# Patient Record
Sex: FEMALE | Race: WHITE | Employment: OTHER | URBAN - METROPOLITAN AREA
[De-identification: names, ages, dates, MRNs, and addresses within clinical notes are randomized per-mention and may not be internally consistent; named-entity substitution may affect disease eponyms.]

---

## 2019-03-09 ENCOUNTER — HOSPITAL ENCOUNTER (INPATIENT)
Facility: HOSPITAL | Age: 54
LOS: 3 days | Discharge: HOME | DRG: 311 | End: 2019-03-12
Attending: EMERGENCY MEDICINE | Admitting: HOSPITALIST
Payer: COMMERCIAL

## 2019-03-09 ENCOUNTER — APPOINTMENT (OUTPATIENT)
Dept: CT IMAGING | Facility: HOSPITAL | Age: 54
DRG: 311 | End: 2019-03-09
Attending: EMERGENCY MEDICINE
Payer: COMMERCIAL

## 2019-03-09 ENCOUNTER — APPOINTMENT (OUTPATIENT)
Dept: GENERAL RADIOLOGY | Facility: HOSPITAL | Age: 54
DRG: 311 | End: 2019-03-09
Attending: EMERGENCY MEDICINE
Payer: COMMERCIAL

## 2019-03-09 DIAGNOSIS — R77.8 ELEVATED TROPONIN: Primary | ICD-10-CM

## 2019-03-09 DIAGNOSIS — R11.2 NAUSEA AND VOMITING, INTRACTABILITY OF VOMITING NOT SPECIFIED, UNSPECIFIED VOMITING TYPE: ICD-10-CM

## 2019-03-09 DIAGNOSIS — R10.13 EPIGASTRIC PAIN: ICD-10-CM

## 2019-03-09 PROBLEM — R73.9 HYPERGLYCEMIA: Status: ACTIVE | Noted: 2019-03-09

## 2019-03-09 LAB
ALBUMIN SERPL-MCNC: 4.1 G/DL (ref 3.4–5)
ALBUMIN/GLOB SERPL: 1.2 {RATIO} (ref 1–2)
ALP LIVER SERPL-CCNC: 103 U/L (ref 41–108)
ALT SERPL-CCNC: 18 U/L (ref 13–56)
ANION GAP SERPL CALC-SCNC: 9 MMOL/L (ref 0–18)
APTT PPP: 26.9 SECONDS (ref 26.1–34.6)
APTT PPP: 60.9 SECONDS (ref 26.1–34.6)
AST SERPL-CCNC: 27 U/L (ref 15–37)
BASOPHILS # BLD AUTO: 0.04 X10(3) UL (ref 0–0.2)
BASOPHILS NFR BLD AUTO: 0.3 %
BILIRUB SERPL-MCNC: 0.5 MG/DL (ref 0.1–2)
BUN BLD-MCNC: 14 MG/DL (ref 7–18)
BUN/CREAT SERPL: 11.7 (ref 10–20)
CALCIUM BLD-MCNC: 9.5 MG/DL (ref 8.5–10.1)
CHLORIDE SERPL-SCNC: 110 MMOL/L (ref 98–107)
CO2 SERPL-SCNC: 23 MMOL/L (ref 21–32)
CREAT BLD-MCNC: 1.2 MG/DL (ref 0.55–1.02)
DEPRECATED RDW RBC AUTO: 42.7 FL (ref 35.1–46.3)
EOSINOPHIL # BLD AUTO: 0.02 X10(3) UL (ref 0–0.7)
EOSINOPHIL NFR BLD AUTO: 0.1 %
ERYTHROCYTE [DISTWIDTH] IN BLOOD BY AUTOMATED COUNT: 13.9 % (ref 11–15)
GLOBULIN PLAS-MCNC: 3.4 G/DL (ref 2.8–4.4)
GLUCOSE BLD-MCNC: 114 MG/DL (ref 70–99)
HCT VFR BLD AUTO: 41.7 % (ref 35–48)
HGB BLD-MCNC: 14 G/DL (ref 12–16)
IMM GRANULOCYTES # BLD AUTO: 0.06 X10(3) UL (ref 0–1)
IMM GRANULOCYTES NFR BLD: 0.4 %
LIPASE SERPL-CCNC: 124 U/L (ref 73–393)
LYMPHOCYTES # BLD AUTO: 1.96 X10(3) UL (ref 1–4)
LYMPHOCYTES NFR BLD AUTO: 14.4 %
M PROTEIN MFR SERPL ELPH: 7.5 G/DL (ref 6.4–8.2)
MCH RBC QN AUTO: 28.2 PG (ref 26–34)
MCHC RBC AUTO-ENTMCNC: 33.6 G/DL (ref 31–37)
MCV RBC AUTO: 84.1 FL (ref 80–100)
MONOCYTES # BLD AUTO: 0.42 X10(3) UL (ref 0.1–1)
MONOCYTES NFR BLD AUTO: 3.1 %
NEUTROPHILS # BLD AUTO: 11.12 X10 (3) UL (ref 1.5–7.7)
NEUTROPHILS # BLD AUTO: 11.12 X10(3) UL (ref 1.5–7.7)
NEUTROPHILS NFR BLD AUTO: 81.7 %
OSMOLALITY SERPL CALC.SUM OF ELEC: 295 MOSM/KG (ref 275–295)
PLATELET # BLD AUTO: 228 10(3)UL (ref 150–450)
POTASSIUM SERPL-SCNC: 4 MMOL/L (ref 3.5–5.1)
RBC # BLD AUTO: 4.96 X10(6)UL (ref 3.8–5.3)
SODIUM SERPL-SCNC: 142 MMOL/L (ref 136–145)
TROPONIN I SERPL-MCNC: 0.91 NG/ML (ref ?–0.04)
TROPONIN I SERPL-MCNC: 1.83 NG/ML (ref ?–0.04)
WBC # BLD AUTO: 13.6 X10(3) UL (ref 4–11)

## 2019-03-09 PROCEDURE — 99223 1ST HOSP IP/OBS HIGH 75: CPT | Performed by: HOSPITALIST

## 2019-03-09 PROCEDURE — 71275 CT ANGIOGRAPHY CHEST: CPT | Performed by: EMERGENCY MEDICINE

## 2019-03-09 PROCEDURE — 71045 X-RAY EXAM CHEST 1 VIEW: CPT | Performed by: EMERGENCY MEDICINE

## 2019-03-09 PROCEDURE — 74175 CTA ABDOMEN W/CONTRAST: CPT | Performed by: EMERGENCY MEDICINE

## 2019-03-09 RX ORDER — ASPIRIN 81 MG/1
324 TABLET, CHEWABLE ORAL DAILY
Status: DISCONTINUED | OUTPATIENT
Start: 2019-03-09 | End: 2019-03-10

## 2019-03-09 RX ORDER — LORAZEPAM 2 MG/ML
1 INJECTION INTRAMUSCULAR ONCE
Status: COMPLETED | OUTPATIENT
Start: 2019-03-09 | End: 2019-03-09

## 2019-03-09 RX ORDER — MAGNESIUM HYDROXIDE/ALUMINUM HYDROXICE/SIMETHICONE 120; 1200; 1200 MG/30ML; MG/30ML; MG/30ML
30 SUSPENSION ORAL ONCE
Status: COMPLETED | OUTPATIENT
Start: 2019-03-09 | End: 2019-03-09

## 2019-03-09 RX ORDER — NICOTINE 21 MG/24HR
1 PATCH, TRANSDERMAL 24 HOURS TRANSDERMAL DAILY
Status: DISCONTINUED | OUTPATIENT
Start: 2019-03-09 | End: 2019-03-12

## 2019-03-09 RX ORDER — HEPARIN SODIUM AND DEXTROSE 10000; 5 [USP'U]/100ML; G/100ML
12 INJECTION INTRAVENOUS ONCE
Status: COMPLETED | OUTPATIENT
Start: 2019-03-09 | End: 2019-03-10

## 2019-03-09 RX ORDER — HYDRALAZINE HYDROCHLORIDE 20 MG/ML
10 INJECTION INTRAMUSCULAR; INTRAVENOUS ONCE
Status: COMPLETED | OUTPATIENT
Start: 2019-03-09 | End: 2019-03-09

## 2019-03-09 RX ORDER — TRAZODONE HYDROCHLORIDE 50 MG/1
50 TABLET ORAL NIGHTLY PRN
Status: DISCONTINUED | OUTPATIENT
Start: 2019-03-09 | End: 2019-03-12

## 2019-03-09 RX ORDER — NITROGLYCERIN 20 MG/100ML
10 INJECTION INTRAVENOUS CONTINUOUS
Status: DISCONTINUED | OUTPATIENT
Start: 2019-03-09 | End: 2019-03-11

## 2019-03-09 RX ORDER — HEPARIN SODIUM AND DEXTROSE 10000; 5 [USP'U]/100ML; G/100ML
INJECTION INTRAVENOUS CONTINUOUS
Status: DISCONTINUED | OUTPATIENT
Start: 2019-03-09 | End: 2019-03-10

## 2019-03-09 RX ORDER — HYDRALAZINE HYDROCHLORIDE 20 MG/ML
10 INJECTION INTRAMUSCULAR; INTRAVENOUS EVERY 4 HOURS PRN
Status: DISCONTINUED | OUTPATIENT
Start: 2019-03-09 | End: 2019-03-10

## 2019-03-09 RX ORDER — HEPARIN SODIUM 5000 [USP'U]/ML
60 INJECTION INTRAVENOUS; SUBCUTANEOUS ONCE
Status: COMPLETED | OUTPATIENT
Start: 2019-03-09 | End: 2019-03-09

## 2019-03-09 RX ORDER — MORPHINE SULFATE 4 MG/ML
4 INJECTION, SOLUTION INTRAMUSCULAR; INTRAVENOUS ONCE
Status: COMPLETED | OUTPATIENT
Start: 2019-03-09 | End: 2019-03-09

## 2019-03-09 RX ORDER — ONDANSETRON 2 MG/ML
4 INJECTION INTRAMUSCULAR; INTRAVENOUS EVERY 6 HOURS PRN
Status: DISCONTINUED | OUTPATIENT
Start: 2019-03-09 | End: 2019-03-11

## 2019-03-09 RX ORDER — SODIUM CHLORIDE 9 MG/ML
INJECTION, SOLUTION INTRAVENOUS CONTINUOUS
Status: DISCONTINUED | OUTPATIENT
Start: 2019-03-09 | End: 2019-03-12

## 2019-03-09 RX ORDER — ASPIRIN 81 MG/1
324 TABLET, CHEWABLE ORAL ONCE
Status: COMPLETED | OUTPATIENT
Start: 2019-03-09 | End: 2019-03-09

## 2019-03-09 RX ORDER — METOCLOPRAMIDE HYDROCHLORIDE 5 MG/ML
5 INJECTION INTRAMUSCULAR; INTRAVENOUS EVERY 8 HOURS PRN
Status: DISCONTINUED | OUTPATIENT
Start: 2019-03-09 | End: 2019-03-12

## 2019-03-09 RX ORDER — ACETAMINOPHEN 325 MG/1
650 TABLET ORAL EVERY 6 HOURS PRN
Status: DISCONTINUED | OUTPATIENT
Start: 2019-03-09 | End: 2019-03-12

## 2019-03-09 RX ORDER — ONDANSETRON 2 MG/ML
4 INJECTION INTRAMUSCULAR; INTRAVENOUS ONCE
Status: COMPLETED | OUTPATIENT
Start: 2019-03-09 | End: 2019-03-09

## 2019-03-09 NOTE — ED INITIAL ASSESSMENT (HPI)
Pt presents to ER with vomiting and diarrhea after eating a sandwich at [employer]. No urinary complaints. No fever. Pt is shaking and hyperventilating. Skin pale warm dry. Pt is a&ox3. Speaking clearly.

## 2019-03-09 NOTE — CONSULTS
[facility] Cardiology  Report of Consultation    Elvira Days Patient Status:  Emergency    1965 MRN [id number]   Location [address] [facility] Attending Rangel Pedroza MD   Hosp Day # 0 PCP None Pcp     Reason for push, 40 mg, Intravenous, Once  •  Alum & Mag Hydroxide-Simeth (MAALOX) oral suspension 30 mL, 30 mL, Oral, Once  •  hydrALAzine HCl (APRESOLINE) injection 10 mg, 10 mg, Intravenous, Once  •  morphINE sulfate (PF) 4 MG/ML injection 4 mg, 4 mg, Intravenous, results found for: PT, INR     Lab Results   Component Value Date    WBC 13.6 03/09/2019    HGB 14.0 03/09/2019    HCT 41.7 03/09/2019    .0 03/09/2019    CREATSERUM 1.20 03/09/2019    BUN 14 03/09/2019     03/09/2019    K 4.0 03/09/2019    CL

## 2019-03-09 NOTE — H&P
[facility] HOSPITALIST  History and Physical     Modesta Dye Patient Status:  Emergency    1965 MRN [id number]   Location 6[address] [facility] Attending Fifi Dupont MD   Hosp Day # 0 PCP None Pcp     Chief Complaint: Josue Furgregoriosh deficits. CNII-XII grossly intact. Musculoskeletal: Moves all extremities. Extremities: No edema or cyanosis. Integument: No rashes or lesions. Psychiatric: Appropriate mood and affect.       Diagnostic Data:      Labs:  Recent Labs   Lab  03/09/19   1

## 2019-03-09 NOTE — PROGRESS NOTES
03/09/19 3738   Clinical Encounter Type   Visited With Patient and family together   Crisis Visit ED  (cardiac alert)   Referral From Nurse   Referral To    Patient Spiritual Encounters   Spiritual Interventions  initiated relationship w

## 2019-03-09 NOTE — ED NOTES
Patient's family approached multiple times at triage desk - stated she needed to go back before others because she was \"very sick\" Informed patient's family of triage process and that a room was being prepared for the patient.

## 2019-03-09 NOTE — ED PROVIDER NOTES
Patient Seen in: [facility] Emergency Department    History   Patient presents with:  Nausea/Vomiting/Diarrhea (gastrointestinal)  Abdomen/Flank Pain (GI/)    Stated Complaint: vomiting abdominal pain since eating [employer] sandwich at [employer] 152.4 cm (5')   Wt 55.7 kg   SpO2 97%   BMI 23.98 kg/m²         Physical Exam    Hyperventilating  woman being helped onto an emergency department bed by her family after she has \"lost muscle function\" while transferring from wheelchair into bed. the following components:    WBC 13.6 (*)     Neutrophil Absolute Prelim 11.12 (*)     Neutrophil Absolute 11.12 (*)     All other components within normal limits   LIPASE - Normal   PTT, ACTIVATED - Normal   PTT, ACTIVATED - Normal   LIPID PANEL - Normal contrast.  CT angiography is     performed through the pulmonary arterial anatomy. Multi-planar     reformatted/3-D images were created to optimize     visualization of vascular anatomy. Dose reduction techniques were used.      Dose information is transm Dictated by: Denisse Peterson DO on 3/09/2019 at 19:02         Approved by:  Denisse Peterson DO                 XR CHEST AP PORTABLE  (CPT=71045)   Final Result    PROCEDURE:  XR CHEST AP PORTABLE  (CPT=71045)         TECHNIQUE:  AP chest radiograph was obtained Candy Hutton came to the emergency department, saw the patient, patient still with epigastric pain.   Wants to get a CTA of chest abdomen and pelvis to make sure this is not aortic pathology, wants to also treat for gastritis, not to Cath Lab right now

## 2019-03-10 ENCOUNTER — APPOINTMENT (OUTPATIENT)
Dept: CV DIAGNOSTICS | Facility: HOSPITAL | Age: 54
DRG: 311 | End: 2019-03-10
Attending: HOSPITALIST
Payer: COMMERCIAL

## 2019-03-10 LAB
APTT PPP: 54.5 SECONDS (ref 26.1–34.6)
ATRIAL RATE: 51 BPM
ATRIAL RATE: 67 BPM
P AXIS: 75 DEGREES
P AXIS: 75 DEGREES
P-R INTERVAL: 152 MS
P-R INTERVAL: 156 MS
Q-T INTERVAL: 476 MS
Q-T INTERVAL: 494 MS
QRS DURATION: 78 MS
QRS DURATION: 80 MS
QTC CALCULATION (BEZET): 455 MS
QTC CALCULATION (BEZET): 502 MS
R AXIS: 77 DEGREES
R AXIS: 79 DEGREES
T AXIS: 79 DEGREES
T AXIS: 96 DEGREES
TROPONIN I SERPL-MCNC: 0.82 NG/ML (ref ?–0.04)
VENTRICULAR RATE: 51 BPM
VENTRICULAR RATE: 67 BPM

## 2019-03-10 PROCEDURE — 93306 TTE W/DOPPLER COMPLETE: CPT | Performed by: HOSPITALIST

## 2019-03-10 PROCEDURE — 99232 SBSQ HOSP IP/OBS MODERATE 35: CPT | Performed by: HOSPITALIST

## 2019-03-10 RX ORDER — HYOSCYAMINE SULFATE 0.125 MG
0.12 TABLET,DISINTEGRATING ORAL EVERY 4 HOURS PRN
Status: DISCONTINUED | OUTPATIENT
Start: 2019-03-10 | End: 2019-03-12

## 2019-03-10 RX ORDER — ASPIRIN 81 MG/1
81 TABLET ORAL DAILY
Status: DISCONTINUED | OUTPATIENT
Start: 2019-03-10 | End: 2019-03-10

## 2019-03-10 RX ORDER — HYDRALAZINE HYDROCHLORIDE 20 MG/ML
20 INJECTION INTRAMUSCULAR; INTRAVENOUS EVERY 4 HOURS PRN
Status: DISCONTINUED | OUTPATIENT
Start: 2019-03-10 | End: 2019-03-12

## 2019-03-10 RX ORDER — ATORVASTATIN CALCIUM 40 MG/1
40 TABLET, FILM COATED ORAL NIGHTLY
Status: DISCONTINUED | OUTPATIENT
Start: 2019-03-10 | End: 2019-03-12

## 2019-03-10 RX ORDER — LISINOPRIL 10 MG/1
10 TABLET ORAL DAILY
Status: DISCONTINUED | OUTPATIENT
Start: 2019-03-10 | End: 2019-03-12

## 2019-03-10 RX ORDER — ENOXAPARIN SODIUM 100 MG/ML
40 INJECTION SUBCUTANEOUS DAILY
Status: DISCONTINUED | OUTPATIENT
Start: 2019-03-10 | End: 2019-03-12

## 2019-03-10 RX ORDER — ASPIRIN 81 MG/1
81 TABLET ORAL DAILY
Status: DISCONTINUED | OUTPATIENT
Start: 2019-03-11 | End: 2019-03-12

## 2019-03-10 NOTE — PROGRESS NOTES
[facility] Cardiology  Progress Note    Cephus Homans Patient Status:  Observation    1965 MRN [id number]   [facility] 6NE-A Attending Mary Lou Lazo MD   Hosp Day # 0 PCP None Pcp     Subjective: still with abdomina 03/09/19   1556   ALT  18   AST  27   ALB  4.1       Recent Labs   Lab  03/09/19   1556  03/09/19   2102  03/10/19   0428   TROP  1.830*  0.906*  0.818*       Imaging:  Xr Chest Ap Portable  (cpt=71045)    Result Date: 3/9/2019  PROCEDURE:  XR CHEST AP POR thickening, or significant calcification. PLEURA:  No mass or effusion. CHEST WALL:  No mass or axillary adenopathy. VASCULATURE:    No visible pulmonary arterial thrombus or attenuation.      ABDOMEN: LIVER:  No enlargement, atrophy, abnormal density, or discharge   nicotine (NICODERM CQ) 21 MG/24HR 1 patch 1 patch Transdermal Daily   nitroGLYCERIN infusion 50mg in D5W 250ml 10 mcg/min Intravenous Continuous       Allergies:    Penicillins             HIVES      Impression:  1.  Acute abdominal pain, post-p

## 2019-03-10 NOTE — PROGRESS NOTES
Pharmacy Note: Renal dose adjustment for Metoclopramide (Reglan)  Elvira Pascal has been prescribed Metoclopramide (Reglan) 10 mg every 8 hours as needed. Estimated Creatinine Clearance: 38 mL/min (A) (based on SCr of 1.2 mg/dL (H)).     Her calculat

## 2019-03-10 NOTE — PLAN OF CARE
Pt received alert/oriented x4, PALACIOS, following all commands, denies any SOB. C/o occasional abdominal cramping and then feels very nauseated with this. Pt vomits and then feels \"better\". Denies any chest pain. Lungs clear.  Dr. Cody Garcias notified of Ag Deras

## 2019-03-10 NOTE — PROGRESS NOTES
[facility] Resource Referral Counselor Note    Price Danielle Patient Status:  Observation    1965 MRN [id number]   [facility] 6NE-A Attending Gregorio Cedeño MD   Hosp Day # 0 PCP None Pcp       S(subjective) \"I feel muc

## 2019-03-10 NOTE — PROGRESS NOTES
[facility]  Progress note     Ying Ruano Patient Status:  Emergency    1965 MRN [id number]   Location 656 [facility] Attending Nba Kay MD   Hosp Day # 0 PCP None Pcp     Chief Complaint: epigastric pain reviewed and negative  2.  Hx CAD with stent    Quality:  · DVT Prophylaxis: heparin drip  · CODE status: full  · Mccray: no    Plan of care discussed with patient and rn    Luz Sanders MD

## 2019-03-11 LAB
ANION GAP SERPL CALC-SCNC: 11 MMOL/L (ref 0–18)
APTT PPP: 26.8 SECONDS (ref 26.1–34.6)
ATRIAL RATE: 60 BPM
ATRIAL RATE: 72 BPM
BUN BLD-MCNC: 14 MG/DL (ref 7–18)
BUN/CREAT SERPL: 15.7 (ref 10–20)
CALCIUM BLD-MCNC: 8.7 MG/DL (ref 8.5–10.1)
CHLORIDE SERPL-SCNC: 107 MMOL/L (ref 98–107)
CHOLEST SMN-MCNC: 155 MG/DL (ref ?–200)
CO2 SERPL-SCNC: 22 MMOL/L (ref 21–32)
CREAT BLD-MCNC: 0.89 MG/DL (ref 0.55–1.02)
EST. AVERAGE GLUCOSE BLD GHB EST-MCNC: 117 MG/DL (ref 68–126)
GLUCOSE BLD-MCNC: 110 MG/DL (ref 70–99)
HBA1C MFR BLD HPLC: 5.7 % (ref ?–5.7)
HDLC SERPL-MCNC: 50 MG/DL (ref 40–59)
LDLC SERPL CALC-MCNC: 80 MG/DL (ref ?–100)
NONHDLC SERPL-MCNC: 105 MG/DL (ref ?–130)
OSMOLALITY SERPL CALC.SUM OF ELEC: 291 MOSM/KG (ref 275–295)
P AXIS: 77 DEGREES
P AXIS: 82 DEGREES
P-R INTERVAL: 132 MS
P-R INTERVAL: 132 MS
POTASSIUM SERPL-SCNC: 3.3 MMOL/L (ref 3.5–5.1)
POTASSIUM SERPL-SCNC: 3.5 MMOL/L (ref 3.5–5.1)
Q-T INTERVAL: 442 MS
Q-T INTERVAL: 488 MS
QRS DURATION: 76 MS
QRS DURATION: 76 MS
QTC CALCULATION (BEZET): 483 MS
QTC CALCULATION (BEZET): 488 MS
R AXIS: 79 DEGREES
R AXIS: 82 DEGREES
SODIUM SERPL-SCNC: 140 MMOL/L (ref 136–145)
T AXIS: 75 DEGREES
T AXIS: 78 DEGREES
T4 FREE SERPL-MCNC: 1.1 NG/DL (ref 0.8–1.7)
TRIGL SERPL-MCNC: 124 MG/DL (ref 30–149)
TSI SER-ACNC: 1.08 MIU/ML (ref 0.36–3.74)
VENTRICULAR RATE: 60 BPM
VENTRICULAR RATE: 72 BPM
VLDLC SERPL CALC-MCNC: 25 MG/DL (ref 0–30)

## 2019-03-11 PROCEDURE — 99232 SBSQ HOSP IP/OBS MODERATE 35: CPT | Performed by: HOSPITALIST

## 2019-03-11 RX ORDER — ARIPIPRAZOLE 15 MG/1
40 TABLET ORAL EVERY 4 HOURS
Status: DISCONTINUED | OUTPATIENT
Start: 2019-03-11 | End: 2019-03-11

## 2019-03-11 RX ORDER — POTASSIUM CHLORIDE 20 MEQ/1
40 TABLET, EXTENDED RELEASE ORAL EVERY 4 HOURS
Status: COMPLETED | OUTPATIENT
Start: 2019-03-11 | End: 2019-03-11

## 2019-03-11 RX ORDER — MAGNESIUM HYDROXIDE/ALUMINUM HYDROXICE/SIMETHICONE 120; 1200; 1200 MG/30ML; MG/30ML; MG/30ML
30 SUSPENSION ORAL 4 TIMES DAILY PRN
Status: DISCONTINUED | OUTPATIENT
Start: 2019-03-11 | End: 2019-03-12

## 2019-03-11 RX ORDER — METOPROLOL TARTRATE 50 MG/1
50 TABLET, FILM COATED ORAL
Status: DISCONTINUED | OUTPATIENT
Start: 2019-03-11 | End: 2019-03-12

## 2019-03-11 RX ORDER — ONDANSETRON 2 MG/ML
8 INJECTION INTRAMUSCULAR; INTRAVENOUS EVERY 6 HOURS PRN
Status: DISCONTINUED | OUTPATIENT
Start: 2019-03-11 | End: 2019-03-12

## 2019-03-11 RX ORDER — POTASSIUM CHLORIDE 20 MEQ/1
40 TABLET, EXTENDED RELEASE ORAL ONCE
Status: COMPLETED | OUTPATIENT
Start: 2019-03-11 | End: 2019-03-11

## 2019-03-11 NOTE — PROGRESS NOTES
[facility]  Progress note     Elena Angela Patient Status:  Emergency    1965 MRN [id number]   Location 656 [facility] Attending Emma Orantes MD   Hosp Day # 0 PCP None Pcp     Chief Complaint: epigastric pain elevation-asa/bb/nitro drip; ischemic w/u once GI symptoms better per cards. Heparin drip stopped. 2. Possible gastroenteritis? Prn antiemetics; fluids  3.  Hx CAD with stent  4. htn-bb/acei-has only received one dose lisinopril; monitor response to these

## 2019-03-11 NOTE — PAYOR COMM NOTE
[facility]  --------------  ADMISSION REVIEW     Payor: [address] [place] [address] Drive #:  [id number]  Authorization Number: [id number]      ED Provider Notes        Patient Seen in: [facility] Emergency Department    History   Patient presents with:  [place] Rodriguez the following components:       Result Value    Glucose 114 (*)     Chloride 110 (*)     Creatinine 1.20 (*)     GFR, Non- 51 (*)     GFR, -American 59 (*)     All other components within normal limits   TROPONIN I - Abnormal; Notabl presents     with vomiting and diarrhea after eating a sandwich from [employer]. FINDINGS:  Normal heart size and pulmonary vascularity.   Clear lungs.         =====    CONCLUSION:  No abnormality demonstrated in the chest.                MD after had nausea, vomiting and epigastric upset. In ER found to have trop elevation. Says this feels similar to hx of stent. Still with significant discomfort.     Physical Exam:    BP (!) 147/95   Pulse 57   Temp 97.3 °F (36.3 °C) (Temporal)   Resp 16 3/10/2019 1613 Given 25 mg Oral Asiya Mejia RN      nicotine (NICODERM CQ) 21 MG/24HR 1 patch     Date Action Dose Route User    3/11/2019 0915 Patch Applied 1 patch Transdermal (Left Upper Arm) Yannick White, RN      nitroGLYCERIN inf 3/11/2019 1207 Given 40 mEq Oral Essie Avila RN      0.9%  NaCl infusion     Date Action Dose Route User    3/11/2019 1200 Rate/Dose Verify (none) Intravenous Essie Avila RN    3/11/2019 1000 Rate/Dose Verify (none) Intravenous Scooter thoracic or abdominal aortic aneurysm, dissection or hematoma. 2. Lungs are clear. 3. No acute process within the abdomen identified.

## 2019-03-11 NOTE — PROGRESS NOTES
[facility]  Progress Note    Elena Christianrhinarochelle Patient Status:  Observation    1965 MRN [id number]   [facility] 6NE-A Attending Eve Andrew MD   Hosp Day # 0 PCP None Pcp       Subjective:  Some nausea this am after getting or 03/11/19 7824   • Nitroglycerin in D5W 15 mcg/min (03/11/19 0700)       Assessment:      1. Nausea/vomiting/abd pain/fevers/elevated wbc- gastroenteritis vs food poisoning vs other. Not cardiac in nature  2.  Elevated trop/abd ekg in pt with know cad, hx p

## 2019-03-11 NOTE — PLAN OF CARE
Received alert and oriented x4. VSS on RA. NSR on monitors. Denies CP/SOB. Nitro per order, titrate as able for BP. Up with SBA to bathroom. IVF running. Zofran/reglan PRN for nausea. Will continue to monitor.

## 2019-03-12 ENCOUNTER — APPOINTMENT (OUTPATIENT)
Dept: CV DIAGNOSTICS | Facility: HOSPITAL | Age: 54
DRG: 311 | End: 2019-03-12
Attending: INTERNAL MEDICINE
Payer: COMMERCIAL

## 2019-03-12 VITALS
WEIGHT: 122.81 LBS | HEART RATE: 60 BPM | TEMPERATURE: 99 F | RESPIRATION RATE: 18 BRPM | BODY MASS INDEX: 24.11 KG/M2 | SYSTOLIC BLOOD PRESSURE: 122 MMHG | DIASTOLIC BLOOD PRESSURE: 74 MMHG | HEIGHT: 60 IN | OXYGEN SATURATION: 97 %

## 2019-03-12 LAB
PLATELET # BLD AUTO: 163 10(3)UL (ref 150–450)
POTASSIUM SERPL-SCNC: 4.4 MMOL/L (ref 3.5–5.1)

## 2019-03-12 PROCEDURE — 93017 CV STRESS TEST TRACING ONLY: CPT | Performed by: INTERNAL MEDICINE

## 2019-03-12 PROCEDURE — 99232 SBSQ HOSP IP/OBS MODERATE 35: CPT | Performed by: HOSPITALIST

## 2019-03-12 PROCEDURE — 93018 CV STRESS TEST I&R ONLY: CPT | Performed by: INTERNAL MEDICINE

## 2019-03-12 PROCEDURE — 78452 HT MUSCLE IMAGE SPECT MULT: CPT | Performed by: INTERNAL MEDICINE

## 2019-03-12 RX ORDER — METOPROLOL SUCCINATE 50 MG/1
50 TABLET, EXTENDED RELEASE ORAL
Status: DISCONTINUED | OUTPATIENT
Start: 2019-03-13 | End: 2019-03-12

## 2019-03-12 RX ORDER — LISINOPRIL 20 MG/1
20 TABLET ORAL DAILY
Status: DISCONTINUED | OUTPATIENT
Start: 2019-03-13 | End: 2019-03-12

## 2019-03-12 RX ORDER — ASPIRIN 81 MG/1
81 TABLET ORAL DAILY
Qty: 30 TABLET | Refills: 0 | Status: SHIPPED | COMMUNITY
Start: 2019-03-13

## 2019-03-12 RX ORDER — NICOTINE 21 MG/24HR
1 PATCH, TRANSDERMAL 24 HOURS TRANSDERMAL DAILY
Qty: 30 PATCH | Refills: 2 | Status: SHIPPED | OUTPATIENT
Start: 2019-03-13

## 2019-03-12 RX ORDER — ATORVASTATIN CALCIUM 40 MG/1
40 TABLET, FILM COATED ORAL NIGHTLY
Qty: 30 TABLET | Refills: 3 | Status: SHIPPED | OUTPATIENT
Start: 2019-03-12

## 2019-03-12 RX ORDER — ONDANSETRON 2 MG/ML
4 INJECTION INTRAMUSCULAR; INTRAVENOUS EVERY 6 HOURS PRN
Status: DISCONTINUED | OUTPATIENT
Start: 2019-03-12 | End: 2019-03-12

## 2019-03-12 RX ORDER — LISINOPRIL 20 MG/1
20 TABLET ORAL DAILY
Qty: 30 TABLET | Refills: 3 | Status: SHIPPED | OUTPATIENT
Start: 2019-03-13

## 2019-03-12 RX ORDER — METOPROLOL SUCCINATE 50 MG/1
50 TABLET, EXTENDED RELEASE ORAL
Qty: 30 TABLET | Refills: 3 | Status: SHIPPED | OUTPATIENT
Start: 2019-03-13

## 2019-03-12 NOTE — PROGRESS NOTES
[facility]  Cardiology Progress Note    Subjective:  No chest pain or shortness of breath. Endorses minimal appetite and feelings of abdominal discomfort. No pain or nausea.      Objective:  /84 (BP Location: Left arm)   Pulse 52   Temp 98.2 °F ( ROD [place]  3/12/2019  9:48 AM    Addendum:  Nuclear stress test negative for reversible ischemia w/ EF ~62%. No cardiac objection to discharge. F/u in office ~2-4 weeks.     SHELIA Torrez      =======================================================

## 2019-03-12 NOTE — PROGRESS NOTES
Nuclear stress test complete. No ST depression or arrhythmias noted. Pt denied cardiac symptoms. Pt exercised 9 min on a Michael protocol which is ave. FTRTHR due to beta blockade.   Resting HR 58 /60  Peak  /78  Final report pending car

## 2019-03-12 NOTE — PROGRESS NOTES
[facility]  Progress note     Alvarado Devi Patient Status:  Emergency    1965 MRN [id number]   Location [address] [facility] [address] Attending Nerissa Prajapati MD   [address] Day # 3 PCP None Pcp     Chief Complaint: epigastric pain 1556  03/09/19   2102  03/10/19   0428   TROP  1.830*  0.906*  0.818*       Imaging: Imaging data reviewed in Epic. ASSESSMENT / PLAN:     1. Epigastric upset and slight trop elevation-asa/bb; await cards recs about possible ischemic w/u  2.  Possible

## 2019-03-12 NOTE — PROGRESS NOTES
Pt transferred to [address] [place] [address] at [address] [facility] via wheelchair with transport. Pt denies abd pain & nausea. Will continue to monitor.

## 2019-03-12 NOTE — PLAN OF CARE
Problem: Patient/Family Goals  Goal: Patient/Family Long Term Goal  Patient's Long Term Goal: go home    Interventions:  - follow-up with cardiology for cardiac clearance  - education on discharge medications    Outcome: Progressing    Goal: Patient/Family balance  Outcome: Progressing    Goal: Maintains or returns to baseline bowel function  INTERVENTIONS:  - Assess bowel function  - Maintain adequate hydration with IV or PO as ordered and tolerated  - Evaluate effectiveness of GI medications  - Encourage m

## 2019-03-12 NOTE — PROGRESS NOTES
PT DISCHARGED HOME AS SCHEDULED. DISCHARGE INSTRUCTIONS AND MED RX GIVEN TO PT. PT VERBALIZES UNDERSTANDING OF INSTRUCTIONS. TELE MONITOR OFF AND RETURNED. IV D/C'D PRIOR TO DISCHARGE.  PT ACCOMPANIED BY TRANSPORT TO [facility] VIA WHEELCHAIR FOR DISCHARGE

## 2019-03-12 NOTE — PROGRESS NOTES
Assumed care of pt at 1900. Pt is A & O x 4. Pt is NSR on tele, S1 and S2 present and pt denies cardiac symptoms. Lung sounds clear. Abdomen soft and round. Last BM on 3/9/19. No nausea or abdominal pain at this time.  Tolerating medications and care needs

## 2019-03-13 NOTE — DISCHARGE SUMMARY
[facility] [place] HOSPITALIST  DISCHARGE SUMMARY     Dylon Tristan Patient Status:  Inpatient    1965 MRN [id number]   [facility] 2NE-A Attending No att. providers found   Hosp Day # 3 PCP None Pcp     Date of Admission: 3/9/2019  Date of Dis 3     lisinopril 20 MG Tabs  Commonly known as:  PRINIVIL,ZESTRIL      Take 1 tablet (20 mg total) by mouth daily. Quantity:  30 tablet  Refills:  3     Metoprolol Succinate ER 50 MG Tb24  Commonly known as:   Toprol XL      Take 1 tablet (50 mg total) by MD    Time spent:  > 30 minutes